# Patient Record
Sex: FEMALE | Race: WHITE | NOT HISPANIC OR LATINO | Employment: OTHER | URBAN - METROPOLITAN AREA
[De-identification: names, ages, dates, MRNs, and addresses within clinical notes are randomized per-mention and may not be internally consistent; named-entity substitution may affect disease eponyms.]

---

## 2017-07-14 ENCOUNTER — OFFICE VISIT (OUTPATIENT)
Dept: URGENT CARE | Facility: PHYSICIAN GROUP | Age: 82
End: 2017-07-14
Payer: MEDICARE

## 2017-07-14 VITALS
WEIGHT: 126 LBS | DIASTOLIC BLOOD PRESSURE: 66 MMHG | RESPIRATION RATE: 18 BRPM | OXYGEN SATURATION: 96 % | HEART RATE: 68 BPM | BODY MASS INDEX: 24.74 KG/M2 | TEMPERATURE: 97.9 F | HEIGHT: 60 IN | SYSTOLIC BLOOD PRESSURE: 108 MMHG

## 2017-07-14 DIAGNOSIS — J22 ACUTE LOWER RESPIRATORY TRACT INFECTION: ICD-10-CM

## 2017-07-14 DIAGNOSIS — R05.8 PRODUCTIVE COUGH: ICD-10-CM

## 2017-07-14 PROCEDURE — 99214 OFFICE O/P EST MOD 30 MIN: CPT | Performed by: NURSE PRACTITIONER

## 2017-07-14 RX ORDER — CODEINE PHOSPHATE AND GUAIFENESIN 10; 100 MG/5ML; MG/5ML
5 SOLUTION ORAL
Qty: 120 ML | Refills: 0 | Status: SHIPPED | OUTPATIENT
Start: 2017-07-14 | End: 2019-06-22

## 2017-07-14 RX ORDER — DOXYCYCLINE HYCLATE 100 MG
100 TABLET ORAL 2 TIMES DAILY
Qty: 14 TAB | Refills: 0 | Status: SHIPPED | OUTPATIENT
Start: 2017-07-14 | End: 2017-07-21

## 2017-07-14 NOTE — PROGRESS NOTES
Chief Complaint   Patient presents with   • Cough     Coughing/sore throat x2 weeks        HISTORY OF PRESENT ILLNESS: Patient is a 86 y.o. female who presents today due to symptoms that started two weeks ago, worsening. Pt reports a productive cough, without blood in sputum, and a sore throat. Denies fever, sinus pressure, ear pain, chest pain, shortness of breath, or wheezing. Denies h/o asthma/copd/CAP. + Immunocompromise. Has tried OTC cold medications without significant relief of symptoms. No recent ABX use. No other aggravating or alleviating factors.     There are no active problems to display for this patient.      Allergies:Review of patient's allergies indicates no known allergies.    Current Outpatient Prescriptions Ordered in [facility]   Medication Sig Dispense Refill   • HYDROCHLOROTHIAZIDE PO Take  by mouth.     • guaifenesin-codeine (ROBITUSSIN AC) Solution oral solution Take 5 mL by mouth at bedtime as needed. 120 mL 0   • doxycycline (VIBRAMYCIN) 100 MG Tab Take 1 Tab by mouth 2 times a day for 7 days. 14 Tab 0   • Probiotic Product (ALIGN PO) Take  by mouth.     • Ibrutinib (IMBRUVICA) 140 MG CAPS Take  by mouth 3 times a day.     • Escitalopram Oxalate (LEXAPRO PO) Take  by mouth.     • lisinopril (PRINIVIL) 10 MG TABS Take 10 mg by mouth every day.       No current [facility]-ordered facility-administered medications on file.       Past Medical History   Diagnosis Date   • Depression    • CLL (chronic lymphocytic leukemia) (CMS-[facility])    • Hypertension        Social History   Substance Use Topics   • Smoking status: Never Smoker    • Smokeless tobacco: Never Used   • Alcohol Use: No       No family status information on file.   History reviewed. No pertinent family history.    ROS:  Review of Systems   Constitutional: Negative for fever, chills, weight loss and malaise.  HENT: Positive for sore throat. Negative for congestion, ear pain, nosebleeds, and neck pain.    Eyes: Negative for vision changes.    Cardiovascular: Negative for chest pain, palpitations, orthopnea and leg swelling.   Respiratory: Positive for cough and sputum production. Negative for shortness of breath and wheezing.   Gastrointestinal: Negative for abdominal pain, nausea, vomiting or diarrhea.   Skin: Negative for rash, diaphoresis.     Exam:  Blood pressure 108/66, pulse 68, temperature 36.6 °C (97.9 °F), resp. rate 18, height 1.524 m (5'), weight 57.153 kg (126 lb), SpO2 96 %.  General: well-nourished, well-developed female in NAD  Head: normocephalic, atraumatic  Eyes: PERRLA, EOM within normal limits, no conjunctival injection, no scleral icterus, visual fields and acuity grossly intact.  Ears: normal shape and symmetry, no tenderness, no discharge. External canals are without any significant edema or erythema. Tympanic membranes are without any inflammation, no effusion. Gross auditory acuity is intact.  Nose: symmetrical without tenderness, mild discharge, erythema present bilateral nares.  Mouth/Throat: reasonable hygiene, no exudates or tonsillar enlargement. Erythema present.   Neck: no masses, range of motion within normal limits, no tracheal deviation.  Lymph: mild cervical adenopathy. No supraclavicular adenopathy.   Neuro: alert and oriented. Cranial nerves 1-12 grossly intact.   Cardiovascular: regular rate and rhythm without murmurs, rubs, or gallops. No edema.   Pulmonary: no distress. Chest is symmetrical with respiration, no wheezes, crackles, or rhonchi.   Musculoskeletal: appropriate muscle tone, gait is stable.  Skin: warm, dry, intact, no clubbing, no cyanosis.   Psych: appropriate mood, affect, judgement.         Assessment/Plan:  1. Acute lower respiratory tract infection  doxycycline (VIBRAMYCIN) 100 MG Tab   2. Productive cough  guaifenesin-codeine (ROBITUSSIN AC) Solution oral solution           Doxy as directed for length of infection with worsening and immunocompromise. Robitussin AC for night time cough, start  with 2.5mL, sedative effects of medication discussed with patient, fall precautions addressed. Rest, increase fluids, hand and respiratory hygiene. May take OTC medications as directed for symptom relief. Honey for cough.   Supportive care, differential diagnoses, and indications for immediate follow-up discussed with patient.   Pathogenesis of diagnosis discussed including typical length and natural progression.  Instructed to return to clinic or nearest emergency department for any change in condition, further concerns, or worsening of symptoms.  Patient states understanding of the plan of care and discharge instructions.  Instructed to make an appointment with their primary care provider in the next 3-7 days if not significantly improving and for further care.         Please note that this dictation was created using voice recognition software. I have made every reasonable attempt to correct obvious errors, but I expect that there are errors of grammar and possibly content that I did not discover before finalizing the note.      SHAWN Rutherford.

## 2017-07-14 NOTE — MR AVS SNAPSHOT
Clotilde Jade Pena   2017 8:55 AM   Office Visit   MRN: [id number]    Department:  [facility]   Dept Phone:  [phone]    Description:  Female : 1930   Provider:  SABINE Goddard           Reason for Visit     Cough Coughing/sore throat x2 weeks       Allergies as of 2017     No Known Allergies      You were diagnosed with     Acute lower respiratory tract infection   [946285]       Productive cough   [665068]         Vital Signs     Blood Pressure Pulse Temperature Respirations Height Weight    108/66 mmHg 68 36.6 °C (97.9 °F) 18 1.524 m (5') 57.153 kg (126 lb)    Body Mass Index Oxygen Saturation Smoking Status             24.61 kg/m2 96% Never Smoker          Basic Information     Date Of Birth Sex Race Ethnicity Preferred Language    1930 Female White Non- English      Health Maintenance        Date Due Completion Dates    IMM DTaP/Tdap/Td Vaccine (1 - Tdap) 1949 ---    PAP SMEAR 1951 ---    MAMMOGRAM 1970 ---    COLONOSCOPY 1980 ---    IMM ZOSTER VACCINE 1990 ---    BONE DENSITY 1995 ---    IMM PNEUMOCOCCAL 65+ (ADULT) LOW/MEDIUM RISK SERIES (1 of 2 - PCV13) 1995 ---    IMM INFLUENZA (1) 2017 ---            Current Immunizations     No immunizations on file.      Below and/or attached are the medications your provider expects you to take. Review all of your home medications and newly ordered medications with your provider and/or pharmacist. Follow medication instructions as directed by your provider and/or pharmacist. Please keep your medication list with you and share with your provider. Update the information when medications are discontinued, doses are changed, or new medications (including over-the-counter products) are added; and carry medication information at all times in the event of emergency situations     Allergies:  No Known Allergies          Medications  Valid as of: 2017 - 11:11 AM    Generic Name  Brand Name Tablet Size Instructions for use    Doxycycline Hyclate (Tab) VIBRAMYCIN 100 MG Take 1 Tab by mouth 2 times a day for 7 days.        Escitalopram Oxalate   Take  by mouth.        Guaifenesin-Codeine (Solution) ROBITUSSIN -10 mg/5mL Take 5 mL by mouth at bedtime as needed.        HydroCHLOROthiazide   Take  by mouth.        Ibrutinib (Cap) Ibrutinib 140 MG Take  by mouth 3 times a day.        Lisinopril (Tab) PRINIVIL 10 MG Take 10 mg by mouth every day.        Probiotic Product   Take  by mouth.        .                 Medicines prescribed today were sent to:     [id number]  [address] [place] - [address] [facility]    [address] [place] [address]    Phone: [phone] Fax: [phone]    Open 24 Hours?: No      Medication refill instructions:       If your prescription bottle indicates you have medication refills left, it is not necessary to call your provider’s office. Please contact your pharmacy and they will refill your medication.    If your prescription bottle indicates you do not have any refills left, you may request refills at any time through one of the following ways: The online Medmonk system (except Urgent Care), by calling your provider’s office, or by asking your pharmacy to contact your provider’s office with a refill request. Medication refills are processed only during regular business hours and may not be available until the next business day. Your provider may request additional information or to have a follow-up visit with you prior to refilling your medication.   *Please Note: Medication refills are assigned a new Rx number when refilled electronically. Your pharmacy may indicate that no refills were authorized even though a new prescription for the same medication is available at the pharmacy. Please request the medicine by name with the pharmacy before contacting your provider for a refill.           Medmonk Access Code:  [email or web address]  Expires: 8/13/2017 11:11 AM    Your email address is not on file at [facility].  Email Addresses are required for you to sign up for Comprehensive Care, please contact [phone] to verify your personal information and to provide your email address prior to attempting to register for Comprehensive Care.    Clotilde Pena  [address] [place] [address]    [employer]ator[employer]  A secure, online tool to manage your health information     [facility]’s Comprehensive Care® is a secure, online tool that connects you to your personalized health information from the privacy of your home -- day or night - making it very easy for you to manage your healthcare. Once the activation process is completed, you can even access your medical information using the Comprehensive Care ashok, which is available for free in the Apple Ashok store or Google Play store.     To learn more about Comprehensive Care, visit [email or web address]    There are two levels of access available (as shown below):   My Chart Features  [facility] Primary Care Doctor [facility]  Specialists [facility]  Urgent  Care Non-[facility] Primary Care Doctor   Email your healthcare team securely and privately 24/7 X X X    Manage appointments: schedule your next appointment; view details of past/upcoming appointments X      Request prescription refills. X      View recent personal medical records, including lab and immunizations X X X X   View health record, including health history, allergies, medications X X X X   Read reports about your outpatient visits, procedures, consult and ER notes X X X X   See your discharge summary, which is a recap of your hospital and/or ER visit that includes your diagnosis, lab results, and care plan X X  X     How to register for mentiont:  Once your e-mail address has been verified, follow the following steps to sign up for mentiont.     1. Go to  [email or web address]  2. Click on the Sign Up Now box, which takes you to the New Member Sign Up page. You will need to provide the  following information:  a. Enter your Contour Access Code exactly as it appears at the top of this page. (You will not need to use this code after you’ve completed the sign-up process. If you do not sign up before the expiration date, you must request a new code.)   b. Enter your date of birth.   c. Enter your home email address.   d. Click Submit, and follow the next screen’s instructions.  3. Create a Contour ID. This will be your Contour login ID and cannot be changed, so think of one that is secure and easy to remember.  4. Create a Contour password. You can change your password at any time.  5. Enter your Password Reset Question and Answer. This can be used at a later time if you forget your password.   6. Enter your e-mail address. This allows you to receive e-mail notifications when new information is available in Contour.  7. Click Sign Up. You can now view your health information.    For assistance activating your Contour account, call [phone]

## 2018-03-25 ENCOUNTER — OFFICE VISIT (OUTPATIENT)
Dept: URGENT CARE | Facility: PHYSICIAN GROUP | Age: 83
End: 2018-03-25
Payer: MEDICARE

## 2018-03-25 ENCOUNTER — HOSPITAL ENCOUNTER (OUTPATIENT)
Facility: MEDICAL CENTER | Age: 83
End: 2018-03-25
Attending: PHYSICIAN ASSISTANT
Payer: MEDICARE

## 2018-03-25 VITALS
WEIGHT: 129 LBS | BODY MASS INDEX: 25.32 KG/M2 | OXYGEN SATURATION: 94 % | HEART RATE: 77 BPM | SYSTOLIC BLOOD PRESSURE: 110 MMHG | DIASTOLIC BLOOD PRESSURE: 70 MMHG | TEMPERATURE: 97 F | RESPIRATION RATE: 16 BRPM | HEIGHT: 60 IN

## 2018-03-25 DIAGNOSIS — N30.01 ACUTE CYSTITIS WITH HEMATURIA: ICD-10-CM

## 2018-03-25 LAB
APPEARANCE UR: NORMAL
BILIRUB UR STRIP-MCNC: NORMAL MG/DL
COLOR UR AUTO: YELLOW
GLUCOSE UR STRIP.AUTO-MCNC: NORMAL MG/DL
KETONES UR STRIP.AUTO-MCNC: NORMAL MG/DL
LEUKOCYTE ESTERASE UR QL STRIP.AUTO: NORMAL
NITRITE UR QL STRIP.AUTO: NORMAL
PH UR STRIP.AUTO: 7 [PH] (ref 5–8)
PROT UR QL STRIP: 100 MG/DL
RBC UR QL AUTO: NORMAL
SP GR UR STRIP.AUTO: 1.01
UROBILINOGEN UR STRIP-MCNC: NORMAL MG/DL

## 2018-03-25 PROCEDURE — 99214 OFFICE O/P EST MOD 30 MIN: CPT | Performed by: PHYSICIAN ASSISTANT

## 2018-03-25 PROCEDURE — 81002 URINALYSIS NONAUTO W/O SCOPE: CPT | Performed by: PHYSICIAN ASSISTANT

## 2018-03-25 PROCEDURE — 87086 URINE CULTURE/COLONY COUNT: CPT

## 2018-03-25 RX ORDER — PRAVASTATIN SODIUM 20 MG
20 TABLET ORAL NIGHTLY
COMMUNITY

## 2018-03-25 RX ORDER — SULFAMETHOXAZOLE AND TRIMETHOPRIM 800; 160 MG/1; MG/1
1 TABLET ORAL EVERY 12 HOURS
Qty: 10 TAB | Refills: 0 | Status: SHIPPED | OUTPATIENT
Start: 2018-03-25 | End: 2018-03-30

## 2018-03-25 ASSESSMENT — ENCOUNTER SYMPTOMS
FEVER: 0
NAUSEA: 0
ABDOMINAL PAIN: 0
VOMITING: 0
FLANK PAIN: 0
DIARRHEA: 0
MUSCULOSKELETAL NEGATIVE: 1
DIZZINESS: 0
SHORTNESS OF BREATH: 0
CHILLS: 0

## 2018-03-25 ASSESSMENT — PAIN SCALES - GENERAL: PAINLEVEL: 9=SEVERE PAIN

## 2018-03-25 NOTE — PROGRESS NOTES
Subjective:      Clotilde Pena is a 87 y.o. female who presents with Cystitis (x 24 hours)            Dysuria    This is a new problem. The current episode started yesterday. The problem occurs every urination. The problem has been unchanged. The quality of the pain is described as burning. The pain is at a severity of 2/10. The pain is mild. There has been no fever. There is no history of pyelonephritis. Associated symptoms include frequency and urgency. Pertinent negatives include no chills, discharge, flank pain, hematuria, nausea or vomiting. She has tried nothing for the symptoms. There is no history of catheterization, kidney stones, recurrent UTIs or a urological procedure.       Review of Systems   Constitutional: Negative for chills and fever.   HENT: Negative for congestion.    Respiratory: Negative for shortness of breath.    Cardiovascular: Negative for chest pain.   Gastrointestinal: Negative for abdominal pain, diarrhea, nausea and vomiting.   Genitourinary: Positive for dysuria, frequency and urgency. Negative for flank pain and hematuria.   Musculoskeletal: Negative.    Skin: Negative for rash.   Neurological: Negative for dizziness.          Objective:     /70   Pulse 77   Temp 36.1 °C (97 °F)   Resp 16   Ht 1.524 m (5')   Wt 58.5 kg (129 lb)   SpO2 94%   BMI 25.19 kg/m²      Physical Exam   Constitutional: She is oriented to person, place, and time. She appears well-developed and well-nourished. No distress.   HENT:   Head: Normocephalic and atraumatic.   Eyes: Conjunctivae are normal. Pupils are equal, round, and reactive to light. Right eye exhibits no discharge. Left eye exhibits no discharge.   Neck: Normal range of motion.   Cardiovascular: Normal rate.    Pulmonary/Chest: Effort normal.   Abdominal: Soft. Bowel sounds are normal. She exhibits no distension. There is no tenderness. There is no guarding.   No CVAT bilaterally   Musculoskeletal: Normal range of motion.    Neurological: She is alert and oriented to person, place, and time.   Skin: Skin is warm and dry. She is not diaphoretic.   Psychiatric: She has a normal mood and affect. Her behavior is normal.   Nursing note and vitals reviewed.          PMH:  has a past medical history of CLL (chronic lymphocytic leukemia) (CMS-HCC); Depression; and Hypertension.  MEDS:   Current Outpatient Prescriptions:   •  pravastatin (PRAVACHOL) 20 MG Tab, Take 20 mg by mouth every evening., Disp: , Rfl:   •  sulfamethoxazole-trimethoprim (BACTRIM DS) 800-160 MG tablet, Take 1 Tab by mouth every 12 hours for 5 days., Disp: 10 Tab, Rfl: 0  •  HYDROCHLOROTHIAZIDE PO, Take  by mouth., Disp: , Rfl:   •  Probiotic Product (ALIGN PO), Take  by mouth., Disp: , Rfl:   •  Ibrutinib (IMBRUVICA) 140 MG CAPS, Take  by mouth 3 times a day., Disp: , Rfl:   •  Escitalopram Oxalate (LEXAPRO PO), Take  by mouth., Disp: , Rfl:   •  lisinopril (PRINIVIL) 10 MG TABS, Take 10 mg by mouth every day., Disp: , Rfl:   •  guaifenesin-codeine (ROBITUSSIN AC) Solution oral solution, Take 5 mL by mouth at bedtime as needed., Disp: 120 mL, Rfl: 0  ALLERGIES: No Known Allergies  SURGHX: History reviewed. No pertinent surgical history.  SOCHX:  reports that she has never smoked. She has never used smokeless tobacco. She reports that she does not drink alcohol or use drugs.  FH: family history is not on file.    POCT Urinalysis:  Component Results     Component Value Ref Range & Units Status   POC Color Yellow  Negative Final   POC Appearance Cloudy  Negative Final   POC Leukocyte Esterase Moderate  Negative Final   POC Nitrites Neg  Negative Final   POC Urobiligen Neg  Negative (0.2) mg/dL Final   POC Protein 100  Negative mg/dL Final   POC Urine PH 7.0  5.0 - 8.0 Final   POC Blood Large  Negative Final   POC Specific Gravity 1.015  <1.005 - >1.030 Final   POC Ketones Neg  Negative mg/dL Final   POC Bilirubin Neg  Negative mg/dL Final   POC Glucose Neg  Negative mg/dL  Final   Last Resulted Time   Sun Mar 25, 2018 11:56 AM     Assessment/Plan:     1. Acute cystitis with hematuria  - POCT Urinalysis --> +leuks, +blood  - URINE CULTURE(NEW); Future  - sulfamethoxazole-trimethoprim (BACTRIM DS) 800-160 MG tablet; Take 1 Tab by mouth every 12 hours for 5 days.  Dispense: 10 Tab; Refill: 0   - Complete full course of antibiotics as prescribed     - Pt educated on preventative measures for avoiding future UTIs  - Advised to increase fluid intake  - OTC Pyridium (Azo) for symptomatic relief, advised that it will turn urine orange in color  - Pending urine culture  - ER precautions given regarding pyelonephritis including fevers greater than 101 and, vomiting and dehydration, increased back pain.

## 2018-03-27 LAB
BACTERIA UR CULT: NORMAL
SIGNIFICANT IND 70042: NORMAL
SITE SITE: NORMAL
SOURCE SOURCE: NORMAL

## 2018-03-29 ENCOUNTER — TELEPHONE (OUTPATIENT)
Dept: URGENT CARE | Facility: PHYSICIAN GROUP | Age: 83
End: 2018-03-29

## 2018-03-29 NOTE — TELEPHONE ENCOUNTER
Left message for patient informing her of negative urine culture results. Encouraged to return my call with any questions or concerns.

## 2019-03-23 ENCOUNTER — HOSPITAL ENCOUNTER (OUTPATIENT)
Facility: MEDICAL CENTER | Age: 84
End: 2019-03-23
Attending: FAMILY MEDICINE
Payer: MEDICARE

## 2019-03-23 ENCOUNTER — OFFICE VISIT (OUTPATIENT)
Dept: URGENT CARE | Facility: PHYSICIAN GROUP | Age: 84
End: 2019-03-23
Payer: MEDICARE

## 2019-03-23 VITALS
SYSTOLIC BLOOD PRESSURE: 112 MMHG | DIASTOLIC BLOOD PRESSURE: 76 MMHG | WEIGHT: 120 LBS | OXYGEN SATURATION: 94 % | HEART RATE: 78 BPM | BODY MASS INDEX: 23.56 KG/M2 | TEMPERATURE: 97.4 F | HEIGHT: 60 IN

## 2019-03-23 DIAGNOSIS — R30.0 DYSURIA: ICD-10-CM

## 2019-03-23 LAB
APPEARANCE UR: NORMAL
BILIRUB UR STRIP-MCNC: NORMAL MG/DL
COLOR UR AUTO: NORMAL
GLUCOSE UR STRIP.AUTO-MCNC: NORMAL MG/DL
KETONES UR STRIP.AUTO-MCNC: NORMAL MG/DL
LEUKOCYTE ESTERASE UR QL STRIP.AUTO: NORMAL
NITRITE UR QL STRIP.AUTO: NORMAL
PH UR STRIP.AUTO: 7 [PH] (ref 5–8)
PROT UR QL STRIP: NORMAL MG/DL
RBC UR QL AUTO: NORMAL
SP GR UR STRIP.AUTO: 1.01
UROBILINOGEN UR STRIP-MCNC: 0.2 MG/DL

## 2019-03-23 PROCEDURE — 87077 CULTURE AEROBIC IDENTIFY: CPT

## 2019-03-23 PROCEDURE — 81002 URINALYSIS NONAUTO W/O SCOPE: CPT | Performed by: FAMILY MEDICINE

## 2019-03-23 PROCEDURE — 99214 OFFICE O/P EST MOD 30 MIN: CPT | Performed by: FAMILY MEDICINE

## 2019-03-23 PROCEDURE — 87186 SC STD MICRODIL/AGAR DIL: CPT

## 2019-03-23 PROCEDURE — 87086 URINE CULTURE/COLONY COUNT: CPT

## 2019-03-23 RX ORDER — SULFAMETHOXAZOLE AND TRIMETHOPRIM 800; 160 MG/1; MG/1
1 TABLET ORAL EVERY 12 HOURS
Qty: 10 TAB | Refills: 0 | Status: SHIPPED | OUTPATIENT
Start: 2019-03-23 | End: 2019-03-28

## 2019-03-23 ASSESSMENT — ENCOUNTER SYMPTOMS
FOCAL WEAKNESS: 0
CHILLS: 0
DIZZINESS: 0
ORTHOPNEA: 0
SHORTNESS OF BREATH: 0
HEMOPTYSIS: 0
FEVER: 0

## 2019-03-23 NOTE — PROGRESS NOTES
Subjective:      Clotilde Pena is a 88 y.o. female who presents with UTI (x1/burning/frequent/)      - This is a very pleasant, well and non-toxic appearing 88 y.o. female with complaints of freq/burn x 1 day. No NVFC          ALLERGIES:  Patient has no known allergies.     PMH:  Past Medical History:   Diagnosis Date   • CLL (chronic lymphocytic leukemia) (HCC)    • Depression    • Hypertension         PSH:  History reviewed. No pertinent surgical history.    MEDS:    Current Outpatient Prescriptions:   •  sulfamethoxazole-trimethoprim (BACTRIM DS) 800-160 MG tablet, Take 1 Tab by mouth every 12 hours for 5 days., Disp: 10 Tab, Rfl: 0  •  pravastatin (PRAVACHOL) 20 MG Tab, Take 20 mg by mouth every evening., Disp: , Rfl:   •  HYDROCHLOROTHIAZIDE PO, Take  by mouth., Disp: , Rfl:   •  guaifenesin-codeine (ROBITUSSIN AC) Solution oral solution, Take 5 mL by mouth at bedtime as needed., Disp: 120 mL, Rfl: 0  •  Probiotic Product (ALIGN PO), Take  by mouth., Disp: , Rfl:   •  Ibrutinib (IMBRUVICA) 140 MG CAPS, Take  by mouth 3 times a day., Disp: , Rfl:   •  Escitalopram Oxalate (LEXAPRO PO), Take  by mouth., Disp: , Rfl:   •  lisinopril (PRINIVIL) 10 MG TABS, Take 10 mg by mouth every day., Disp: , Rfl:     ** I have documented what I find to be significant in regards to past medical, social, family and surgical history  in my HPI or under PMH/PSH/FH review section, otherwise it is contributory **           HPI    Review of Systems   Constitutional: Negative for chills and fever.   Respiratory: Negative for hemoptysis and shortness of breath.    Cardiovascular: Negative for chest pain and orthopnea.   Genitourinary: Positive for dysuria and frequency.   Neurological: Negative for dizziness and focal weakness.          Objective:     /76   Pulse 78   Temp 36.3 °C (97.4 °F)   Ht 1.524 m (5')   Wt 54.4 kg (120 lb)   SpO2 94%   BMI 23.44 kg/m²      Physical Exam   Constitutional: She appears  well-developed. No distress.   HENT:   Head: Normocephalic and atraumatic.   Eyes: Conjunctivae are normal.   Neck: Neck supple.   Cardiovascular: Regular rhythm.    No murmur heard.  Pulmonary/Chest: Effort normal. No respiratory distress.   Abdominal: Soft. There is no tenderness.   Neurological: She is alert. She exhibits normal muscle tone.   Skin: Skin is warm and dry.   Psychiatric: She has a normal mood and affect. Judgment normal.   Nursing note and vitals reviewed.              Assessment/Plan:         1. Dysuria  POCT Urinalysis    URINE CULTURE(NEW)    sulfamethoxazole-trimethoprim (BACTRIM DS) 800-160 MG tablet             Dx & d/c instructions discussed w/ patient and/or family members.     ER precautions (worsening signs symptoms and when to go to ER) discussed.    Follow up w/ PCP in 2-3 days to make sure symptoms improving and no further intervention/treatment and/or work-up needed was advised, ER if feeling worse or not improving in 2 days.    Possible side effects (i.e. Rash, GI upset/constipation, sedation, elevation of BP or sugars) of any medications given discussed.     Patient left in stable condition

## 2019-03-25 LAB
BACTERIA UR CULT: ABNORMAL
BACTERIA UR CULT: ABNORMAL
SIGNIFICANT IND 70042: ABNORMAL
SITE SITE: ABNORMAL
SOURCE SOURCE: ABNORMAL

## 2019-03-29 ENCOUNTER — TELEPHONE (OUTPATIENT)
Dept: URGENT CARE | Facility: CLINIC | Age: 84
End: 2019-03-29

## 2019-03-29 RX ORDER — CEPHALEXIN 500 MG/1
500 CAPSULE ORAL 3 TIMES DAILY
Qty: 15 CAP | Refills: 0 | Status: SHIPPED | OUTPATIENT
Start: 2019-03-29 | End: 2019-04-03

## 2019-03-29 NOTE — TELEPHONE ENCOUNTER
Kindly call (DOCUMENT CALL OR ATTEMPTED CALL IN [facility]) and let patient know:     Urine culture showed some bacteria that the antibiotic we put her on may not work against. If she is still having UTI symptoms I have e-scribed another abx that should work better. Thanks           ** Patient may call clinic when I am scheduled to work with any questions that any provider currently on duty is unable/uncomfortable to manage. Any risky/concerning issues ((i.e. chest pain, allergic reactions, medication side effects, bloody vomiting/diarrhea/sputum, SOB, dizziness, weakness, prescription issues etc)) if I am not on schedule to work should be run past provider on duty first to see if patient needs to return to clinic or go to ER.    As always, if I am not on the schedule to work I will not receive any questions/emails/concerns until I am back in the office to check my work messages.     MA's/Staff may always attempt to call/text my cell phone for any issues but there is no guarantee I will be able to respond as I may be in an area with limited cell phone service or in another country. Thanks **

## 2019-06-10 ENCOUNTER — APPOINTMENT (RX ONLY)
Dept: URBAN - METROPOLITAN AREA CLINIC 4 | Facility: CLINIC | Age: 84
Setting detail: DERMATOLOGY
End: 2019-06-10

## 2019-06-10 DIAGNOSIS — D18.0 HEMANGIOMA: ICD-10-CM

## 2019-06-10 DIAGNOSIS — L82.1 OTHER SEBORRHEIC KERATOSIS: ICD-10-CM

## 2019-06-10 DIAGNOSIS — R21 RASH AND OTHER NONSPECIFIC SKIN ERUPTION: ICD-10-CM

## 2019-06-10 DIAGNOSIS — D22 MELANOCYTIC NEVI: ICD-10-CM

## 2019-06-10 DIAGNOSIS — L81.4 OTHER MELANIN HYPERPIGMENTATION: ICD-10-CM

## 2019-06-10 PROBLEM — D22.72 MELANOCYTIC NEVI OF LEFT LOWER LIMB, INCLUDING HIP: Status: ACTIVE | Noted: 2019-06-10

## 2019-06-10 PROBLEM — D22.61 MELANOCYTIC NEVI OF RIGHT UPPER LIMB, INCLUDING SHOULDER: Status: ACTIVE | Noted: 2019-06-10

## 2019-06-10 PROBLEM — Z85.6 PERSONAL HISTORY OF LEUKEMIA: Status: ACTIVE | Noted: 2019-06-10

## 2019-06-10 PROBLEM — D48.5 NEOPLASM OF UNCERTAIN BEHAVIOR OF SKIN: Status: ACTIVE | Noted: 2019-06-10

## 2019-06-10 PROBLEM — D18.01 HEMANGIOMA OF SKIN AND SUBCUTANEOUS TISSUE: Status: ACTIVE | Noted: 2019-06-10

## 2019-06-10 PROBLEM — D22.62 MELANOCYTIC NEVI OF LEFT UPPER LIMB, INCLUDING SHOULDER: Status: ACTIVE | Noted: 2019-06-10

## 2019-06-10 PROBLEM — D22.71 MELANOCYTIC NEVI OF RIGHT LOWER LIMB, INCLUDING HIP: Status: ACTIVE | Noted: 2019-06-10

## 2019-06-10 PROBLEM — D22.5 MELANOCYTIC NEVI OF TRUNK: Status: ACTIVE | Noted: 2019-06-10

## 2019-06-10 PROCEDURE — 99202 OFFICE O/P NEW SF 15 MIN: CPT | Mod: 25

## 2019-06-10 PROCEDURE — ? SUNSCREEN RECOMMENDATIONS

## 2019-06-10 PROCEDURE — 11103 TANGNTL BX SKIN EA SEP/ADDL: CPT

## 2019-06-10 PROCEDURE — ? BIOPSY BY SHAVE METHOD

## 2019-06-10 PROCEDURE — ? COUNSELING

## 2019-06-10 PROCEDURE — 11102 TANGNTL BX SKIN SINGLE LES: CPT

## 2019-06-10 ASSESSMENT — LOCATION DETAILED DESCRIPTION DERM
LOCATION DETAILED: RIGHT CENTRAL MALAR CHEEK
LOCATION DETAILED: RIGHT ANTERIOR PROXIMAL THIGH
LOCATION DETAILED: LEFT INFERIOR ANTERIOR NECK
LOCATION DETAILED: RIGHT RIB CAGE
LOCATION DETAILED: LEFT ULNAR DORSAL HAND
LOCATION DETAILED: SUPERIOR THORACIC SPINE
LOCATION DETAILED: RIGHT SUPERIOR MEDIAL MIDBACK
LOCATION DETAILED: LEFT CENTRAL MALAR CHEEK
LOCATION DETAILED: RIGHT PROXIMAL DORSAL FOREARM
LOCATION DETAILED: RIGHT ANTERIOR LATERAL DISTAL UPPER ARM
LOCATION DETAILED: LEFT ANTERIOR PROXIMAL THIGH
LOCATION DETAILED: LEFT SUPERIOR MEDIAL UPPER BACK
LOCATION DETAILED: RIGHT DISTAL POSTERIOR UPPER ARM
LOCATION DETAILED: PERIUMBILICAL SKIN
LOCATION DETAILED: LEFT PROXIMAL POSTERIOR UPPER ARM
LOCATION DETAILED: RIGHT RADIAL DORSAL HAND
LOCATION DETAILED: LEFT PROXIMAL DORSAL FOREARM

## 2019-06-10 ASSESSMENT — LOCATION SIMPLE DESCRIPTION DERM
LOCATION SIMPLE: LEFT POSTERIOR UPPER ARM
LOCATION SIMPLE: RIGHT UPPER ARM
LOCATION SIMPLE: RIGHT POSTERIOR UPPER ARM
LOCATION SIMPLE: RIGHT HAND
LOCATION SIMPLE: ABDOMEN
LOCATION SIMPLE: UPPER BACK
LOCATION SIMPLE: LEFT UPPER BACK
LOCATION SIMPLE: LEFT ANTERIOR NECK
LOCATION SIMPLE: RIGHT CHEEK
LOCATION SIMPLE: RIGHT LOWER BACK
LOCATION SIMPLE: RIGHT FOREARM
LOCATION SIMPLE: LEFT HAND
LOCATION SIMPLE: LEFT FOREARM
LOCATION SIMPLE: LEFT CHEEK
LOCATION SIMPLE: RIGHT THIGH
LOCATION SIMPLE: LEFT THIGH

## 2019-06-10 ASSESSMENT — LOCATION ZONE DERM
LOCATION ZONE: LEG
LOCATION ZONE: ARM
LOCATION ZONE: HAND
LOCATION ZONE: NECK
LOCATION ZONE: FACE
LOCATION ZONE: TRUNK

## 2019-06-10 NOTE — PROCEDURE: BIOPSY BY SHAVE METHOD
Dressing: bandage
Post-Care Instructions: I reviewed with the patient in detail post-care instructions. Patient is to keep the biopsy site dry overnight, and then apply bacitracin twice daily until healed. Patient may apply hydrogen peroxide soaks to remove any crusting.
Notification Instructions: Patient will be notified of biopsy results. However, patient instructed to call the office if not contacted within 2 weeks.
Was A Bandage Applied: Yes
Silver Nitrate Text: The wound bed was treated with silver nitrate after the biopsy was performed.
Lab: [id number]
Detail Level: Detailed
Curettage Text: The wound bed was treated with curettage after the biopsy was performed.
Biopsy Method: Personna blade
Hemostasis: Drysol
Bill For Surgical Tray: no
Consent: Written consent was obtained and risks were reviewed including but not limited to scarring, infection, bleeding, scabbing, incomplete removal, nerve damage and allergy to anesthesia.
Size Of Lesion In Cm: 0
Lab Facility: 
Cryotherapy Text: The wound bed was treated with cryotherapy after the biopsy was performed.
Anesthesia Type: 1% lidocaine with epinephrine
Wound Care: Vaseline
Billing Type: Third-Party Bill
Biopsy Type: H and E
Depth Of Biopsy: dermis
Electrodesiccation Text: The wound bed was treated with electrodesiccation after the biopsy was performed.
Electrodesiccation And Curettage Text: The wound bed was treated with electrodesiccation and curettage after the biopsy was performed.
Anesthesia Volume In Cc: 2
Type Of Destruction Used: Curettage

## 2019-06-22 ENCOUNTER — OFFICE VISIT (OUTPATIENT)
Dept: URGENT CARE | Facility: PHYSICIAN GROUP | Age: 84
End: 2019-06-22
Payer: MEDICARE

## 2019-06-22 VITALS
HEART RATE: 88 BPM | HEIGHT: 60 IN | WEIGHT: 120 LBS | SYSTOLIC BLOOD PRESSURE: 116 MMHG | DIASTOLIC BLOOD PRESSURE: 60 MMHG | TEMPERATURE: 97.9 F | OXYGEN SATURATION: 95 % | RESPIRATION RATE: 14 BRPM | BODY MASS INDEX: 23.56 KG/M2

## 2019-06-22 DIAGNOSIS — N30.01 ACUTE CYSTITIS WITH HEMATURIA: ICD-10-CM

## 2019-06-22 DIAGNOSIS — R82.90 ABNORMAL URINE FINDINGS: ICD-10-CM

## 2019-06-22 LAB
APPEARANCE UR: NORMAL
BILIRUB UR STRIP-MCNC: NORMAL MG/DL
COLOR UR AUTO: YELLOW
GLUCOSE UR STRIP.AUTO-MCNC: NEGATIVE MG/DL
KETONES UR STRIP.AUTO-MCNC: 15 MG/DL
LEUKOCYTE ESTERASE UR QL STRIP.AUTO: NORMAL
NITRITE UR QL STRIP.AUTO: NEGATIVE
PH UR STRIP.AUTO: 6.5 [PH] (ref 5–8)
PROT UR QL STRIP: 300 MG/DL
RBC UR QL AUTO: NORMAL
SP GR UR STRIP.AUTO: 1.02
UROBILINOGEN UR STRIP-MCNC: 1 MG/DL

## 2019-06-22 PROCEDURE — 81002 URINALYSIS NONAUTO W/O SCOPE: CPT | Performed by: NURSE PRACTITIONER

## 2019-06-22 PROCEDURE — 99214 OFFICE O/P EST MOD 30 MIN: CPT | Performed by: NURSE PRACTITIONER

## 2019-06-22 RX ORDER — NITROFURANTOIN 25; 75 MG/1; MG/1
100 CAPSULE ORAL 2 TIMES DAILY
Qty: 14 CAP | Refills: 0 | Status: SHIPPED | OUTPATIENT
Start: 2019-06-22 | End: 2019-06-29

## 2019-06-23 ENCOUNTER — TELEPHONE (OUTPATIENT)
Dept: URGENT CARE | Facility: CLINIC | Age: 84
End: 2019-06-23

## 2019-06-23 NOTE — TELEPHONE ENCOUNTER
Apparently, after the patient left the clinic yesterday, the medical assistant noted that there was not enough urine to send for urine culture.  The patient was called for reevaluation this morning to discuss.  She notes that she is feeling significantly worse today with increased body aches, neck pain, and increased urinary symptoms.  Therefore, the patient is encouraged to go to the emergency department today for further evaluation and care.  She is in agreement and will go this morning without delay.        SHAWN Rutherford.

## 2019-06-23 NOTE — PROGRESS NOTES
Chief Complaint   Patient presents with   • Dysuria     x today, painful and frequency urinating        HISTORY OF PRESENT ILLNESS: Patient is a 88 y.o. female who presents today due to 1 day of urinary burning, urgency, and frequency. Reports some associated pelvic pressure. Denies hematuria, fever, flank pain, N/V. Denies a history of pyelonephritis or kidney stones. Admits to a history of UTIs, last one was in March.     There are no active problems to display for this patient.      Allergies:Patient has no known allergies.    Current Outpatient Prescriptions Ordered in [facility]   Medication Sig Dispense Refill   • nitrofurantoin monohyd macro (MACROBID) 100 MG Cap Take 1 Cap by mouth 2 times a day for 7 days. 14 Cap 0   • pravastatin (PRAVACHOL) 20 MG Tab Take 20 mg by mouth every evening.     • HYDROCHLOROTHIAZIDE PO Take  by mouth.     • Probiotic Product (ALIGN PO) Take  by mouth.     • Ibrutinib (IMBRUVICA) 140 MG Tab Take  by mouth 3 times a day.     • Escitalopram Oxalate (LEXAPRO PO) Take  by mouth.     • lisinopril (PRINIVIL) 10 MG TABS Take 10 mg by mouth every day.       No current [facility]-ordered facility-administered medications on file.        Past Medical History:   Diagnosis Date   • CLL (chronic lymphocytic leukemia) (HCC)    • Depression    • Hypertension        Social History   Substance Use Topics   • Smoking status: Never Smoker   • Smokeless tobacco: Never Used   • Alcohol use No       No family status information on file.   No family history on file.    ROS:  Review of Systems   Constitutional: Negative for fever, chills, weight loss and malaise/fatigue.   HENT: Negative for ear pain, nosebleeds, congestion, sore throat and neck pain.    Eyes: Negative for vision changes.   Neuro: Negative for headache, sensory changes, weakness, seizure, LOC.   Cardiovascular: Negative for chest pain, palpitations, orthopnea and leg swelling.   Respiratory: Negative for cough, sputum production, shortness of  breath and wheezing.   Gastrointestinal: Positive for lower abdominal pressure. Negative for abdominal pain, nausea, vomiting or diarrhea.   Genitourinary: Positive for dysuria, urgency and frequency. Negative for hematuria or flank pain.   Skin: Negative for rash, diaphoresis.     Exam:  /60   Pulse 88   Temp 36.6 °C (97.9 °F) (Temporal)   Resp 14   Ht 1.524 m (5')   Wt 54.4 kg (120 lb)   SpO2 95%   General: well-nourished, well-developed female in NAD  Head: normocephalic, atraumatic  Eyes: PERRLA, no conjunctival injection, acuity grossly intact, lids normal.  Lymph: no cervical adenopathy. No supraclavicular adenopathy.   Neuro: alert and oriented. Cranial nerves 1-12 grossly intact. No sensory deficit.   Cardiovascular: regular rate and rhythm. No edema.  Pulmonary: no distress. Chest is symmetrical with respiration, no wheezes, crackles, or rhonchi.   Abdomen: soft, non-tender, no guarding, no hepatosplenomegaly. No CVA tenderness.   Musculoskeletal: no clubbing, appropriate muscle tone, gait is stable.  Skin: warm, dry, intact, no clubbing, no cyanosis, no rashes.   Psych: appropriate mood, affect, judgement.         Assessment/Plan:  1. Acute cystitis with hematuria  nitrofurantoin monohyd macro (MACROBID) 100 MG Cap    POCT Urinalysis    URINE CULTURE(NEW)   2. Abnormal urine findings         Urine dip: Yellow, cloudy, glucose negative, bilirubin small, ketones 15, specific gravity 1.025, blood large, pH 6.5, proteins greater than 300, nitrates negative, leukocytes small.    Antibiotic as directed. Increase fluid intake. Urine sent for culture.   Supportive care, differential diagnoses, and indications for immediate follow-up discussed with patient.   Pathogenesis of diagnosis discussed including typical length and natural progression.   Instructed to return to clinic or nearest emergency department for any change in condition, further concerns, or worsening of symptoms.  Patient states  understanding of the plan of care and discharge instructions.  Instructed to make an appointment, for follow up, with her primary care provider to address abnormal urine findings.        Please note that this dictation was created using voice recognition software. I have made every reasonable attempt to correct obvious errors, but I expect that there are errors of grammar and possibly content that I did not discover before finalizing the note.      SHAWN Rutherford.

## 2019-11-15 ENCOUNTER — HOSPITAL ENCOUNTER (OUTPATIENT)
Facility: MEDICAL CENTER | Age: 84
End: 2019-11-15
Attending: FAMILY MEDICINE
Payer: MEDICARE

## 2019-11-15 ENCOUNTER — OFFICE VISIT (OUTPATIENT)
Dept: URGENT CARE | Facility: PHYSICIAN GROUP | Age: 84
End: 2019-11-15
Payer: MEDICARE

## 2019-11-15 VITALS
WEIGHT: 126 LBS | OXYGEN SATURATION: 99 % | HEART RATE: 75 BPM | HEIGHT: 60 IN | BODY MASS INDEX: 24.74 KG/M2 | TEMPERATURE: 97.5 F | DIASTOLIC BLOOD PRESSURE: 68 MMHG | SYSTOLIC BLOOD PRESSURE: 110 MMHG

## 2019-11-15 DIAGNOSIS — N30.00 ACUTE CYSTITIS WITHOUT HEMATURIA: ICD-10-CM

## 2019-11-15 PROCEDURE — 87086 URINE CULTURE/COLONY COUNT: CPT

## 2019-11-15 PROCEDURE — 99214 OFFICE O/P EST MOD 30 MIN: CPT | Performed by: FAMILY MEDICINE

## 2019-11-15 RX ORDER — CEFDINIR 300 MG/1
300 CAPSULE ORAL EVERY 12 HOURS
Qty: 10 CAP | Refills: 0 | Status: SHIPPED | OUTPATIENT
Start: 2019-11-15 | End: 2019-11-20

## 2019-11-15 NOTE — PROGRESS NOTES
Subjective:      Clotilde Pena is a 89 y.o. female who presents with Painful Urination (Pt states she is having burning with urination x1day )            1 day urinary burning urgency and frequency.  No fever or flank pain.  No hematuria.  No past medical history of pyelonephritis.  She is having some fatigue.  No OTC medications tried.  No other aggravating or alleviating factors.      Review of Systems   Constitutional: Negative for malaise/fatigue and weight loss.   Eyes: Negative for discharge and redness.   Gastrointestinal: Negative for nausea and vomiting.   Musculoskeletal: Negative for joint pain and myalgias.   Skin: Negative for itching and rash.     .  Medications, Allergies, and current problem list reviewed today in Epic       Objective:     /68   Pulse 75   Temp 36.4 °C (97.5 °F)   Ht 1.524 m (5')   Wt 57.2 kg (126 lb)   SpO2 99%   BMI 24.61 kg/m²      Physical Exam  Constitutional:       General: She is not in acute distress.     Appearance: She is well-developed.   HENT:      Head: Normocephalic and atraumatic.   Eyes:      Conjunctiva/sclera: Conjunctivae normal.   Cardiovascular:      Rate and Rhythm: Normal rate and regular rhythm.      Heart sounds: Normal heart sounds. No murmur.   Pulmonary:      Effort: Pulmonary effort is normal.      Breath sounds: Normal breath sounds. No wheezing.   Genitourinary:     Comments: No CVA or suprapubic tenderness  Skin:     General: Skin is warm and dry.      Findings: No rash.   Neurological:      Mental Status: She is alert and oriented to person, place, and time.                 Assessment/Plan:   UA reviewed    1. Acute cystitis without hematuria  cefdinir (OMNICEF) 300 MG Cap    Urine Culture     Differential diagnosis, natural history, supportive care, and indications for immediate follow-up discussed at length.     Push fluids    Culture grew diphtheroids on further incubation.  Patient clinically resolved on Ceftin ear.

## 2019-11-28 ASSESSMENT — ENCOUNTER SYMPTOMS
WEIGHT LOSS: 0
EYE DISCHARGE: 0
MYALGIAS: 0
EYE REDNESS: 0
NAUSEA: 0
VOMITING: 0

## 2020-03-17 ENCOUNTER — TELEPHONE (OUTPATIENT)
Dept: HEALTH INFORMATION MANAGEMENT | Facility: OTHER | Age: 85
End: 2020-03-17

## 2020-03-17 NOTE — TELEPHONE ENCOUNTER
1. Caller Name: Vika toure               Call Back Number: [phone]  [facility] PCP or Specialty Provider: Yes         2.  Does patient have any active symptoms of respiratory illness (fever OR cough OR shortness of breath)? Yes, the patient reports the following respiratory symptoms: cough for 2 weeks Productive -Hx cancer recent bladder infection     3.  Does patient have any comoribidities? Immunosuppressed    4.  In the last 30 days, has the patient traveled outside of the country OR in a high risk area within the [place] OR have any known contact with someone who has or is suspected to have COVID-19?  No.    5. Disposition: Advised to go to       Note routed to PCP: unable to rout

## 2020-05-14 ENCOUNTER — OFFICE VISIT (OUTPATIENT)
Dept: URGENT CARE | Facility: PHYSICIAN GROUP | Age: 85
End: 2020-05-14
Payer: MEDICARE

## 2020-05-14 ENCOUNTER — TELEPHONE (OUTPATIENT)
Dept: URGENT CARE | Facility: PHYSICIAN GROUP | Age: 85
End: 2020-05-14

## 2020-05-14 VITALS
SYSTOLIC BLOOD PRESSURE: 132 MMHG | OXYGEN SATURATION: 97 % | DIASTOLIC BLOOD PRESSURE: 60 MMHG | HEART RATE: 78 BPM | BODY MASS INDEX: 23.56 KG/M2 | RESPIRATION RATE: 14 BRPM | HEIGHT: 60 IN | TEMPERATURE: 97.4 F | WEIGHT: 120 LBS

## 2020-05-14 DIAGNOSIS — N30.00 ACUTE CYSTITIS WITHOUT HEMATURIA: ICD-10-CM

## 2020-05-14 DIAGNOSIS — R30.0 DYSURIA: ICD-10-CM

## 2020-05-14 LAB
APPEARANCE UR: CLEAR
BILIRUB UR STRIP-MCNC: NEGATIVE MG/DL
COLOR UR AUTO: YELLOW
GLUCOSE UR STRIP.AUTO-MCNC: NEGATIVE MG/DL
KETONES UR STRIP.AUTO-MCNC: NEGATIVE MG/DL
LEUKOCYTE ESTERASE UR QL STRIP.AUTO: NORMAL
NITRITE UR QL STRIP.AUTO: NEGATIVE
PH UR STRIP.AUTO: 7 [PH] (ref 5–8)
PROT UR QL STRIP: NEGATIVE MG/DL
RBC UR QL AUTO: NORMAL
SP GR UR STRIP.AUTO: 1.01
UROBILINOGEN UR STRIP-MCNC: 0.2 MG/DL

## 2020-05-14 PROCEDURE — 81002 URINALYSIS NONAUTO W/O SCOPE: CPT | Performed by: NURSE PRACTITIONER

## 2020-05-14 PROCEDURE — 99214 OFFICE O/P EST MOD 30 MIN: CPT | Performed by: NURSE PRACTITIONER

## 2020-05-14 RX ORDER — CEFDINIR 300 MG/1
300 CAPSULE ORAL 2 TIMES DAILY
Qty: 10 CAP | Refills: 0 | Status: SHIPPED | OUTPATIENT
Start: 2020-05-14 | End: 2020-05-19

## 2020-05-15 NOTE — PROGRESS NOTES
Subjective:      Clotilde Pena is a 89 y.o. female who presents with Dysuria (x2days)        female who presents with DYSURIA        DYSURIA    This is a new problem. The current episode started in the pas 1 days. The problem occurs constantly. The problem has been unchanged. Associated symptoms include increased urinary frequency and urgency.  Pertinent negatives include no change in bowel habit, chest pain, chills, visual change, vomiting or weakness. Nothing aggravates the symptoms. She has tried nothing for the symptoms. History of frequent UTI's        Social History     Socioeconomic History   • Marital status:      Spouse name: Not on file   • Number of children: Not on file   • Years of education: Not on file   • Highest education level: Not on file   Occupational History   • Not on file   Social Needs   • Financial resource strain: Not on file   • Food insecurity     Worry: Not on file     Inability: Not on file   • Transportation needs     Medical: Not on file     Non-medical: Not on file   Tobacco Use   • Smoking status: Never Smoker   • Smokeless tobacco: Never Used   Substance and Sexual Activity   • Alcohol use: No   • Drug use: No   • Sexual activity: Not on file   Lifestyle   • Physical activity     Days per week: Not on file     Minutes per session: Not on file   • Stress: Not on file   Relationships   • Social connections     Talks on phone: Not on file     Gets together: Not on file     Attends [employer]oration[employer] service: Not on file     Active member of club or organization: Not on file     Attends meetings of clubs or organizations: Not on file     Relationship status: Not on file   • Intimate partner violence     Fear of current or ex partner: Not on file     Emotionally abused: Not on file     Physically abused: Not on file     Forced sexual activity: Not on file   Other Topics Concern   • Not on file   Social History Narrative   • Not on file       Past medical history was unremarkable and  not pertinent to current issue    Current Outpatient Medications on File Prior to Visit   Medication Sig Dispense Refill   • pravastatin (PRAVACHOL) 20 MG Tab Take 20 mg by mouth every evening.     • HYDROCHLOROTHIAZIDE PO Take  by mouth.     • Probiotic Product (ALIGN PO) Take  by mouth.     • Ibrutinib (IMBRUVICA) 140 MG Tab Take  by mouth 3 times a day.     • Escitalopram Oxalate (LEXAPRO PO) Take  by mouth.     • lisinopril (PRINIVIL) 10 MG TABS Take 10 mg by mouth every day.       No current facility-administered medications on file prior to visit.        Review of Systems   Constitutional: Negative for fevers or chills.   HENT: negative for cough, congestion, sore throat  Cardiovascular: Negative for chest pain.   Gastrointestinal: Negative for nausea, vomiting and change in bowel habit.   Neurological: Negative for weakness, dizziness.          Objective:   /60 (BP Location: Left arm, Patient Position: Sitting, BP Cuff Size: Adult)   Pulse 78   Temp 36.3 °C (97.4 °F) (Temporal)   Resp 14   Ht 1.524 m (5')   Wt 54.4 kg (120 lb)   SpO2 97%       Physical Exam  HEENT - PERRLA, EOMI  Neuro - alert and oriented x3. CN 2-12 grossly intact.  Lungs - CTA. No wheezes, rhonchi or rales.  Heart - regular rate and rhythm without murmur.  Abdomen - soft and non-tender, bowel sounds active x4.   No flank pain  Musculoskeletal - No lower extremity edema noted.         Lab Results   Component Value Date/Time    POCCOLOR yellow 05/14/2020 05:11 PM    POCAPPEAR clear 05/14/2020 05:11 PM    POCLEUKEST small 05/14/2020 05:11 PM    POCNITRITE negative 05/14/2020 05:11 PM    POCUROBILIGE 0.2 05/14/2020 05:11 PM    POCPROTEIN negative 05/14/2020 05:11 PM    POCURPH 7.0 05/14/2020 05:11 PM    POCBLOOD small 05/14/2020 05:11 PM    POCSPGRV 1.012 05/14/2020 05:11 PM    POCKETONES negative 05/14/2020 05:11 PM    POCBILIRUBIN negative 05/14/2020 05:11 PM    POCGLUCUA negative 05/14/2020 05:11 PM           Assessment/Plan:      1. Dysuria  - POCT Urinalysis    2. Acute cystitis without hematuria  - cefdinir (OMNICEF) 300 MG Cap; Take 1 Cap by mouth 2 times a day for 5 days.  Dispense: 10 Cap; Refill: 0  - URINE CULTURE(NEW); Future  - POCT Urinalysis    Differential diagnosis, natural history, supportive care, and indications for immediate follow-up discussed at length.     Instructed to return to  or nearest emergency department if symptoms fail to improve, for any change in condition, further concerns, or new concerning symptoms.  Patient states understanding of the plan of care and discharge instructions.

## 2020-05-15 NOTE — TELEPHONE ENCOUNTER
Before you left you had ordered a Urine Culture for the pt however, she had not left enough urine to draw up a specimen. Please advise on what you would like to do or cancel the order

## 2020-10-23 ENCOUNTER — HOSPITAL ENCOUNTER (OUTPATIENT)
Facility: MEDICAL CENTER | Age: 85
End: 2020-10-23
Attending: INTERNAL MEDICINE
Payer: MEDICARE

## 2020-10-23 LAB
ALBUMIN SERPL BCP-MCNC: 3.5 G/DL (ref 3.2–4.9)
ALBUMIN/GLOB SERPL: 2.1 G/DL
ALP SERPL-CCNC: 69 U/L (ref 30–99)
ALT SERPL-CCNC: 14 U/L (ref 2–50)
ANION GAP SERPL CALC-SCNC: 10 MMOL/L (ref 7–16)
AST SERPL-CCNC: 47 U/L (ref 12–45)
BILIRUB SERPL-MCNC: 1.7 MG/DL (ref 0.1–1.5)
BUN SERPL-MCNC: 12 MG/DL (ref 8–22)
CALCIUM SERPL-MCNC: 9.6 MG/DL (ref 8.5–10.5)
CHLORIDE SERPL-SCNC: 91 MMOL/L (ref 96–112)
CO2 SERPL-SCNC: 27 MMOL/L (ref 20–33)
CREAT SERPL-MCNC: 0.94 MG/DL (ref 0.5–1.4)
GLOBULIN SER CALC-MCNC: 1.7 G/DL (ref 1.9–3.5)
GLUCOSE SERPL-MCNC: 77 MG/DL (ref 65–99)
LDH SERPL L TO P-CCNC: 250 U/L (ref 107–266)
POTASSIUM SERPL-SCNC: 3.8 MMOL/L (ref 3.6–5.5)
PROT SERPL-MCNC: 5.2 G/DL (ref 6–8.2)
SODIUM SERPL-SCNC: 128 MMOL/L (ref 135–145)
URATE SERPL-MCNC: 4.9 MG/DL (ref 1.9–8.2)

## 2020-10-23 PROCEDURE — 83615 LACTATE (LD) (LDH) ENZYME: CPT

## 2020-10-23 PROCEDURE — 84550 ASSAY OF BLOOD/URIC ACID: CPT

## 2020-10-23 PROCEDURE — 80053 COMPREHEN METABOLIC PANEL: CPT

## 2020-10-26 ENCOUNTER — HOSPITAL ENCOUNTER (OUTPATIENT)
Dept: LAB | Facility: MEDICAL CENTER | Age: 85
End: 2020-10-26
Attending: INTERNAL MEDICINE
Payer: MEDICARE

## 2020-10-26 LAB
ALBUMIN SERPL BCP-MCNC: 3.5 G/DL (ref 3.2–4.9)
ALBUMIN/GLOB SERPL: 1.8 G/DL
ALP SERPL-CCNC: 68 U/L (ref 30–99)
ALT SERPL-CCNC: 14 U/L (ref 2–50)
ANION GAP SERPL CALC-SCNC: 7 MMOL/L (ref 7–16)
AST SERPL-CCNC: 44 U/L (ref 12–45)
BILIRUB SERPL-MCNC: 0.6 MG/DL (ref 0.1–1.5)
BUN SERPL-MCNC: 7 MG/DL (ref 8–22)
CALCIUM SERPL-MCNC: 9.7 MG/DL (ref 8.5–10.5)
CHLORIDE SERPL-SCNC: 97 MMOL/L (ref 96–112)
CO2 SERPL-SCNC: 29 MMOL/L (ref 20–33)
CREAT SERPL-MCNC: 1.07 MG/DL (ref 0.5–1.4)
GLOBULIN SER CALC-MCNC: 2 G/DL (ref 1.9–3.5)
GLUCOSE SERPL-MCNC: 81 MG/DL (ref 65–99)
LDH SERPL L TO P-CCNC: 262 U/L (ref 107–266)
POTASSIUM SERPL-SCNC: 4.1 MMOL/L (ref 3.6–5.5)
PROT SERPL-MCNC: 5.5 G/DL (ref 6–8.2)
SODIUM SERPL-SCNC: 133 MMOL/L (ref 135–145)

## 2020-10-26 PROCEDURE — 80053 COMPREHEN METABOLIC PANEL: CPT

## 2020-10-26 PROCEDURE — 84550 ASSAY OF BLOOD/URIC ACID: CPT

## 2020-10-26 PROCEDURE — 83615 LACTATE (LD) (LDH) ENZYME: CPT

## 2021-01-14 DIAGNOSIS — Z23 NEED FOR VACCINATION: ICD-10-CM

## 2022-06-06 ENCOUNTER — APPOINTMENT (RX ONLY)
Dept: URBAN - METROPOLITAN AREA CLINIC 22 | Facility: CLINIC | Age: 87
Setting detail: DERMATOLOGY
End: 2022-06-06

## 2022-06-06 DIAGNOSIS — R59.0 LOCALIZED ENLARGED LYMPH NODES: ICD-10-CM

## 2022-06-06 DIAGNOSIS — L81.4 OTHER MELANIN HYPERPIGMENTATION: ICD-10-CM

## 2022-06-06 PROBLEM — D48.5 NEOPLASM OF UNCERTAIN BEHAVIOR OF SKIN: Status: ACTIVE | Noted: 2022-06-06

## 2022-06-06 PROCEDURE — ? BIOPSY BY SHAVE METHOD

## 2022-06-06 PROCEDURE — ? COUNSELING

## 2022-06-06 PROCEDURE — 11102 TANGNTL BX SKIN SINGLE LES: CPT

## 2022-06-06 PROCEDURE — ? ADDITIONAL NOTES

## 2022-06-06 PROCEDURE — 99213 OFFICE O/P EST LOW 20 MIN: CPT | Mod: 25

## 2022-06-06 ASSESSMENT — LOCATION ZONE DERM
LOCATION ZONE: FACE
LOCATION ZONE: AXILLAE

## 2022-06-06 ASSESSMENT — LOCATION DETAILED DESCRIPTION DERM
LOCATION DETAILED: LEFT AXILLARY VAULT
LOCATION DETAILED: LEFT LATERAL MALAR CHEEK

## 2022-06-06 ASSESSMENT — LOCATION SIMPLE DESCRIPTION DERM
LOCATION SIMPLE: LEFT CHEEK
LOCATION SIMPLE: LEFT AXILLARY VAULT

## 2022-06-06 NOTE — PROCEDURE: ADDITIONAL NOTES
Detail Level: Simple
Render Risk Assessment In Note?: no
Additional Notes: Suspected left axillary lymphadenopathy. \\nSuggested patient to contact Dr Webber office to have site further evaluated.    Patient is under his care for leukemia.     Unsure how long it has been swollen.

## 2022-06-06 NOTE — HPI: SKIN LESION
Is This A New Presentation, Or A Follow-Up?: Skin Lesions
What Type Of Note Output Would You Prefer (Optional)?: Bullet Format
How Severe Is Your Skin Lesion?: mild
Has Your Skin Lesion Been Treated?: not been treated
Additional History: Dr Davis wishes to have this site evaluated